# Patient Record
Sex: FEMALE | Race: WHITE | NOT HISPANIC OR LATINO | URBAN - METROPOLITAN AREA
[De-identification: names, ages, dates, MRNs, and addresses within clinical notes are randomized per-mention and may not be internally consistent; named-entity substitution may affect disease eponyms.]

---

## 2017-03-25 ENCOUNTER — EMERGENCY (EMERGENCY)
Facility: HOSPITAL | Age: 22
LOS: 1 days | Discharge: HOME | End: 2017-03-25
Attending: EMERGENCY MEDICINE | Admitting: EMERGENCY MEDICINE
Payer: COMMERCIAL

## 2017-03-25 VITALS
SYSTOLIC BLOOD PRESSURE: 130 MMHG | RESPIRATION RATE: 18 BRPM | HEART RATE: 77 BPM | DIASTOLIC BLOOD PRESSURE: 61 MMHG | OXYGEN SATURATION: 100 %

## 2017-03-25 PROCEDURE — 93971 EXTREMITY STUDY: CPT | Mod: 26,LT

## 2017-03-25 PROCEDURE — 99284 EMERGENCY DEPT VISIT MOD MDM: CPT

## 2017-03-25 NOTE — ED PROVIDER NOTE - PROGRESS NOTE DETAILS
No DVT on Sono.  Given calf tenderness and moderate suspicion salvatore recommned 72 hour repeat sono either in ED or via PMD.

## 2017-03-25 NOTE — ED PROVIDER NOTE - OBJECTIVE STATEMENT
21 yr old F sent in for eval of swollen left leg.  States she had a cramp ("Miky Horse") 4 days ago and since then has had persistent left calf pain and swelling.  Also with new swelling to lateral aspect of left foot.  Never happened before.  No fever/chills.  No N/V/D.  No SOB or CP.  No trauma.  Is on OCP for PCOS, non smoker, though her mother smokes by her room and outside.  Saw PMD and was referred here for sono.

## 2017-03-25 NOTE — ED PROVIDER NOTE - PHYSICAL EXAMINATION
Left calf swollen and tender, no erythema, skin WNL. Distal pp intact.   Also small area of swelling on dorsolateral aspect of left foot.

## 2017-03-25 NOTE — ED ADULT TRIAGE NOTE - CHIEF COMPLAINT QUOTE
Co left leg pain, swelling, and redness since Wednesday afternoon. Went to urgent care and pcp yesterday and both suggested to go to ED for ro dvt. Hx of PCOS and has been on birth control for 2 1/2 weeks. Denies long car rides or travel.

## 2018-07-31 NOTE — ED PROVIDER NOTE - NSTIMEPROVIDERCAREINITIATE_GEN_ER
25-Mar-2017 09:42 28 yr. old female PMH: PCOS, HTN, Depression, Anxiety Mild Gastritis presents to ED with abdominal pain for 2 weeks. Worsening after PO intake. Decreased appetite. Subjective fever. Seen by Dr. Valdez and sent to ED for admission to perform and endoscopy in the morning. Seen and examined by attending in Intake. Plan: IV, Labs IVF and admission. Will f/U with results and re evaluate. David NP
